# Patient Record
(demographics unavailable — no encounter records)

---

## 2024-10-11 NOTE — PHYSICAL EXAM
[de-identified] : AP, lateral oblique radiographs of his right thumb demonstrate [de-identified] : - Constitutional: This is a healthy appearing young male in no obvious distress.  He was accompanied by his mother today. - Psych: Patient is alert and oriented to person, place and time.  Patient has a normal mood and affect. - Cardiovascular: Normal pulses throughout the upper extremities.    - Musculoskeletal: Gait is normal.   - Neuro: Strength and sensation are intact throughout the upper extremities.  Patient has normal coordination.  ---  Examination of his left thumb demonstrates a paronychia along the radial aspect of the nail fold.  There is obvious pus and swelling and tenderness.  He is neurovascularly intact distally.

## 2024-10-11 NOTE — HISTORY OF PRESENT ILLNESS
[Right] : right hand dominant [FreeTextEntry1] : He comes in today for evaluation of a left thumb infection which began 4 days ago.   He was accompanied by his mother today.

## 2024-10-11 NOTE — PHYSICAL EXAM
[de-identified] : AP, lateral oblique radiographs of his right thumb demonstrate [de-identified] : - Constitutional: This is a healthy appearing young male in no obvious distress.  He was accompanied by his mother today. - Psych: Patient is alert and oriented to person, place and time.  Patient has a normal mood and affect. - Cardiovascular: Normal pulses throughout the upper extremities.    - Musculoskeletal: Gait is normal.   - Neuro: Strength and sensation are intact throughout the upper extremities.  Patient has normal coordination.  ---  Examination of his left thumb demonstrates a paronychia along the radial aspect of the nail fold.  There is obvious pus and swelling and tenderness.  He is neurovascularly intact distally.

## 2024-10-11 NOTE — DISCUSSION/SUMMARY
[FreeTextEntry1] : He has findings consistent with a left thumb paronychia.  I had a discussion with the patient and their mother regarding today's visit, the prognosis of this diagnosis and treatment recommendations and options.  At this time, I recommended incision and drainage.  They have agreed to the above plan of management and have expressed full understanding.  All questions were fully answered to their satisfaction.   My cumulative time spent on this visit was approximately 45 minutes and included: Preparation for the visit, review of the medical records, review of pertinent diagnostic studies, examination and counseling of the patient and the mother on the above diagnosis, treatment plan and prognosis, orders of diagnostic tests, medications and/or appropriate procedures and documentation in the medical records of today's visit.

## 2024-10-11 NOTE — DISCUSSION/SUMMARY
Quality 226: Preventive Care And Screening: Tobacco Use: Screening And Cessation Intervention: Patient screened for tobacco use and is an ex/non-smoker [FreeTextEntry1] : He has findings consistent with a left thumb paronychia.  I had a discussion with the patient and their mother regarding today's visit, the prognosis of this diagnosis and treatment recommendations and options.  At this time, I recommended incision and drainage.  They have agreed to the above plan of management and have expressed full understanding.  All questions were fully answered to their satisfaction.   My cumulative time spent on this visit was approximately 45 minutes and included: Preparation for the visit, review of the medical records, review of pertinent diagnostic studies, examination and counseling of the patient and the mother on the above diagnosis, treatment plan and prognosis, orders of diagnostic tests, medications and/or appropriate procedures and documentation in the medical records of today's visit.

## 2024-10-11 NOTE — PROCEDURE
[FreeTextEntry1] : Using sterile technique, his left thumb was anesthetized with a digital block with 1% plain lidocaine.  After the thumb was anesthetized, the paronychia was incised and drained.  It was irrigated and a dressing was applied.  He tolerated the procedure well without complications.  His mother was told to remove the dressing this evening and begin 3 times daily warm soaks.  He was prescribed Keflex suspension 250 mg twice daily.  He will follow-up in 4 days.  If she notices persistent pain, swelling or signs of a worsening infection, then she was instructed to immediately call the office or go to the nearest emergency room or urgent care clinic.

## 2024-10-15 NOTE — RETURN TO WORK/SCHOOL
[FreeTextEntry1] : Patient was seen and examined today for his Left thumb. He may participate and return to gym and sports activities.

## 2024-10-15 NOTE — ADDENDUM
[FreeTextEntry1] :  I, Ismael Shetty, acted solely as a scribe for Dr. Melara on this date on 10/15/2024.

## 2024-10-15 NOTE — DISCUSSION/SUMMARY
[FreeTextEntry1] : He and his mother were instructed to take one more antibiotic tonight and then discontinue taking the antibiotic. He was instructed on wound care.  He can return to sports and regular activities.  If he experiences any problems in the future, he will return to my office or go to the nearest urgent care clinic.

## 2024-10-15 NOTE — HISTORY OF PRESENT ILLNESS
[FreeTextEntry1] : 4 days status post incision and drainage of left thumb paronychia.  He is on Keflex 250 mg twice daily.  He is doing well today. He reports he is feeling much better.  He was accompanied by his mother today.

## 2024-10-15 NOTE — PHYSICAL EXAM
[de-identified] : Examination of his left thumb demonstrates his paronychia to have essentially resolved.  There is mild residual erythema which appears more inflammatory than infection.  There is decreased swelling and there is no pus.  He is neurovascularly intact distally.

## 2024-10-15 NOTE — END OF VISIT
[FreeTextEntry3] :  I, Ismael Shetty, acted solely as a scribe for Dr. Melara on this date on 10/15/2024.s

## 2024-10-15 NOTE — PHYSICAL EXAM
[de-identified] : Examination of his left thumb demonstrates his paronychia to have essentially resolved.  There is mild residual erythema which appears more inflammatory than infection.  There is decreased swelling and there is no pus.  He is neurovascularly intact distally.

## 2025-04-25 NOTE — PHYSICAL EXAM
[de-identified] : - Constitutional: This is a healthy appearing young male in no obvious distress.  He was accompanied by his mother today. - Psych: Patient is alert and oriented to person, place and time.  Patient has a normal mood and affect. - Cardiovascular: Normal pulses throughout the upper extremities.    - Musculoskeletal: Gait is normal.    ---  Examination of his left upper extremity demonstrates no obvious swelling.  There is mild tenderness at the elbow along the lateral joint line.  There is no obvious effusion.  He has full flexion and extension of the elbow and pronation supination.  He has complaints of some pain at the wrist and forearm, but there is no localized tenderness or swelling.  He is neurovascularly intact distally. [de-identified] : PA, lateral, and oblique radiographs of the left wrist and elbow demonstrate no fractures, dislocations or abnormalities. His physes are open and appropriate for his age.   ambulatory

## 2025-04-25 NOTE — HISTORY OF PRESENT ILLNESS
[Right] : right hand dominant [FreeTextEntry1] : He comes in today for evaluation of left arm pain after a fall 2 weeks ago while playing sports. He denies any numbness/tingling.   I have seen him in the past regarding a left thumb paronychia which was drained.  He was accompanied by his mother today.

## 2025-04-25 NOTE — PHYSICAL EXAM
[de-identified] : - Constitutional: This is a healthy appearing young male in no obvious distress.  He was accompanied by his mother today. - Psych: Patient is alert and oriented to person, place and time.  Patient has a normal mood and affect. - Cardiovascular: Normal pulses throughout the upper extremities.    - Musculoskeletal: Gait is normal.    ---  Examination of his left upper extremity demonstrates no obvious swelling.  There is mild tenderness at the elbow along the lateral joint line.  There is no obvious effusion.  He has full flexion and extension of the elbow and pronation supination.  He has complaints of some pain at the wrist and forearm, but there is no localized tenderness or swelling.  He is neurovascularly intact distally. [de-identified] : PA, lateral, and oblique radiographs of the left wrist and elbow demonstrate no fractures, dislocations or abnormalities. His physes are open and appropriate for his age.

## 2025-04-25 NOTE — DISCUSSION/SUMMARY
[FreeTextEntry1] : He has findings consistent with a probable left elbow sprain or possibly a Salter-Mcgarry I type fracture of the radial head after an injury 2 weeks ago.  There are no concerning findings on examination and no fractures noted on radiographs.  I had a discussion regarding today's visit, the diagnosis and treatment recommendations and options.  We also discussed changes since the last visit.  At this time, the patient and his mother were instructed on activity modifications when playing sports, according to his symptoms. He will follow in 2 to 3 weeks if his symptoms have not completely resolved or there are residual problems.  The patient has agreed to the above plan of management and has expressed full understanding.  All questions were fully answered to the patient's satisfaction.  My cumulative time spent on today's visit was greater than 30 minutes and included: Preparation for the visit, review of the medical records, review of pertinent diagnostic studies, examination and counseling of the patient on the above diagnosis, treatment plan and prognosis, orders of diagnostic tests, medications and/or appropriate procedures and documentation in the medical records of today's visit.